# Patient Record
Sex: FEMALE | Race: WHITE | NOT HISPANIC OR LATINO | Employment: OTHER | URBAN - NONMETROPOLITAN AREA
[De-identification: names, ages, dates, MRNs, and addresses within clinical notes are randomized per-mention and may not be internally consistent; named-entity substitution may affect disease eponyms.]

---

## 2017-12-05 PROCEDURE — G0123 SCREEN CERV/VAG THIN LAYER: HCPCS | Performed by: OBSTETRICS & GYNECOLOGY

## 2017-12-06 ENCOUNTER — LAB REQUISITION (OUTPATIENT)
Dept: LAB | Facility: HOSPITAL | Age: 67
End: 2017-12-06

## 2017-12-06 DIAGNOSIS — Z12.72 ENCOUNTER FOR SCREENING FOR MALIGNANT NEOPLASM OF VAGINA: ICD-10-CM

## 2017-12-07 LAB
GEN CATEG CVX/VAG CYTO-IMP: NORMAL
LAB AP CASE REPORT: NORMAL
LAB AP GYN ADDITIONAL INFORMATION: NORMAL
LAB AP GYN OTHER FINDINGS: NORMAL
Lab: NORMAL
PATH INTERP SPEC-IMP: NORMAL
STAT OF ADQ CVX/VAG CYTO-IMP: NORMAL

## 2022-06-10 ENCOUNTER — TELEPHONE (OUTPATIENT)
Dept: GASTROENTEROLOGY | Facility: CLINIC | Age: 72
End: 2022-06-10

## 2022-06-10 NOTE — TELEPHONE ENCOUNTER
Pt called and said she does not want to be fast tracked. She wants to be seen in the office and is alright with being seen by Dr. Case on 6-16.

## 2022-06-12 PROBLEM — Z86.010 HISTORY OF ADENOMATOUS POLYP OF COLON: Status: ACTIVE | Noted: 2022-06-12

## 2022-06-12 PROBLEM — Z86.0101 HISTORY OF ADENOMATOUS POLYP OF COLON: Status: ACTIVE | Noted: 2022-06-12

## 2022-06-12 NOTE — PROGRESS NOTES
Primary Physician: Micheline Cervantes MD    Chief Complaint   Patient presents with   • Colon Cancer Screening     Pt presents today for evaluation for colonoscopy-pt's last colon was 7/24/2019 by Dr. Bianchi-personal history of colon polyps; Family history of colon polyps       Subjective     My Marcos is a 72 y.o. female.    HPI   History adenomatous colon polyps  Cologuard testing was positive in July 2019.  Colonoscopy was done at [facility] to assess.  A sessile, flat, villous polyp about 2.5 to 3 cm in diameter in the vicinity of the appendix in the base of the cecum was noted. SM injections of 1:10k epinephrine in NS were performed at the base and around the polyp to reduce risks of post polypectomy hemorrhage and perforation. The lesion was then resected in a piecemeal fashion with a hot snare.  Pathology showed tubulovillous adenoma without any high-grade dysplasia.  A rectal polyp was also ablated.  She was to have a repeat colonoscopy in 3 to 6 months as per standard of care.  She was not happy with her previous GI provider.    In the past she couldnot tolerate the GoLytely prep due to nausea.  Her last preparation was adequate using the MiraLAX prep.      Past Medical History:   Diagnosis Date   • Family history of colonic polyps    • Gout    • History of colon polyps    • Hyperlipidemia    • Hypertension    • Hypothyroidism    • Stage 3a chronic kidney disease (HCC)    • Vitamin D deficiency        Past Surgical History:   Procedure Laterality Date   • COLONOSCOPY  07/24/2019    Dr. Bianchi-Large tubulovillous adenoma in cecum removed in piecemeal fashion.  Rectal polyp ablated.   • HYSTERECTOMY     • OOPHORECTOMY     • ROTATOR CUFF REPAIR Left    • THYROIDECTOMY Bilateral 07/2014    Had one side removed 7/2014 and other side removed 12/2014        Current Outpatient Medications:   •  allopurinol (ZYLOPRIM) 100 MG tablet, Take 2 tablets by mouth Daily., Disp: , Rfl:   •  ascorbic acid  "(VITAMIN C) 500 MG tablet, Take 500 mg by mouth Daily., Disp: , Rfl:   •  aspirin (aspirin) 81 MG EC tablet, Take 81 mg by mouth Daily., Disp: , Rfl:   •  atenolol (TENORMIN) 25 MG tablet, Take 1 tablet by mouth Daily., Disp: , Rfl:   •  atorvastatin (LIPITOR) 20 MG tablet, Take 1 tablet by mouth Daily., Disp: , Rfl:   •  cholecalciferol (VITAMIN D3) 25 MCG (1000 UT) tablet, Take 1,000 Units by mouth Daily., Disp: , Rfl:   •  furosemide (LASIX) 20 MG tablet, Take 20 mg by mouth Daily., Disp: , Rfl:   •  levothyroxine (SYNTHROID, LEVOTHROID) 112 MCG tablet, Take 112 mcg by mouth Daily., Disp: , Rfl:   •  NIFEdipine XL (PROCARDIA XL) 30 MG 24 hr tablet, Take 30 mg by mouth Daily., Disp: , Rfl:     Allergies   Allergen Reactions   • Ace Inhibitors Hives   • Codeine Rash       Social History     Socioeconomic History   • Marital status:    Tobacco Use   • Smoking status: Never Smoker   • Smokeless tobacco: Never Used   Vaping Use   • Vaping Use: Never used   Substance and Sexual Activity   • Alcohol use: Not Currently   • Drug use: Defer   • Sexual activity: Defer       Family History   Problem Relation Age of Onset   • Colon polyps Sister 60   • Colon cancer Neg Hx    • Esophageal cancer Neg Hx    • Liver cancer Neg Hx    • Liver disease Neg Hx    • Rectal cancer Neg Hx    • Stomach cancer Neg Hx        Review of Systems   Respiratory: Negative for shortness of breath.    Cardiovascular: Negative for chest pain.       Objective     /72 (BP Location: Left arm, Patient Position: Sitting, Cuff Size: Adult)   Pulse 75   Temp 97.7 °F (36.5 °C) (Infrared)   Ht 160 cm (63\")   Wt 78.9 kg (174 lb)   SpO2 98%   Breastfeeding No   BMI 30.82 kg/m²     Physical Exam  Constitutional:       Appearance: She is well-developed.   Pulmonary:      Effort: Pulmonary effort is normal.   Musculoskeletal:         General: Normal range of motion.   Skin:     General: Skin is warm.   Neurological:      Mental Status: She is " alert and oriented to person, place, and time.   Psychiatric:         Behavior: Behavior normal.         Lab Results - Last 18 Months   Lab Units 04/18/22  0927 10/26/21  0916 05/13/21  0826 03/24/21  0923   GLUCOSE mg/dL 105 115* 113* 131*   BUN mg/dL 26* 26* 22 25*   CREATININE mg/dL 1.1* 1* 1* 1*   SODIUM mmol/L 146* 142 145 142   POTASSIUM mmol/L 5.3* 5.0 3.8 3.2*   CHLORIDE mmol/L 104 102 105 98   TOTAL CO2 mmol/L 24 27 25 30*   TOTAL PROTEIN g/dL 7.2 7.3 7.6  --    ALBUMIN g/dL 5 5.2 4.8  --    ALT (SGPT) U/L 22 20 19  --    AST (SGOT) U/L 20 20 19  --    ALK PHOS U/L 120* 133* 123*  --    BILIRUBIN mg/dL 0.6 0.6 0.6  --        Lab Results - Last 18 Months   Lab Units 04/18/22  0927 10/26/21  0916 05/13/21  0826   HEMOGLOBIN g/dL 15.1 14.6 14.4   HEMATOCRIT % 48.0* 45.6 43.9   MCV fL 96.6 94.6 92.6   WBC K/uL 11.3* 11.8* 12.8*   RDW % 12.5 12.5 12.5   MPV fL 11.3 11.0 11.5   PLATELETS K/uL 318 296 304       Lab Results - Last 18 Months   Lab Units 04/18/22  0927 10/26/21  0916 05/13/21  0826   TSH uIU/mL 0.638 0.970 3.620   VIT D 25 HYDROXY ng/mL 47.8 60.1 52.7        Path from colonoscopy 7/2019:  FINAL DIAGNOSIS:     Colon, cecal polyp, polypectomy:   Fragments of tubulovillous adenoma   Negative for high-grade dysplasia    CLR/CLR     IMPRESSION/PLAN:    Assessment & Plan      Problem List Items Addressed This Visit        Gastrointestinal Abdominal     History of adenomatous polyp of colon - Primary    Overview     Cologuard testing was positive in July 2019.  Colonoscopy was done at [facility] to assess.  A sessile, flat, villous polyp about 2.5 to 3 cm in diameter in the vicinity of the appendix in the base of the Cecum was noted. SM injections of 1:10k epinephrine in NS were performed at the base and around the polyp to reduce risks of post polypectomy hemorrhage and perforation. The lesion was then resected in a piecemeal fashion with a hot snare.  Pathology showed tubulovillous adenoma without any  high-grade dysplasia.  A rectal polyp was also ablated.  She was to have a repeat colonoscopy in 3 to 6 months as per standard of care.           Current Assessment & Plan     The risks, benefits, and alternatives of colonoscopy were reviewed with the patient today.  Risks including perforation of the colon possibly requiring surgery or colostomy.  Additional risks include risk of bleeding from biopsies or removal of colon tissue.  There is also the risk of a drug reaction or problems with anesthesia.  This will be discussed with the further by the anesthesia team on the day of the procedure.  Lastly there is a possibility of missing a colon polyp or cancer.  The benefits include the diagnosis and management of disease of the colon and rectum.  Alternatives to colonoscopy include barium enema, laboratory testing, radiographic evaluation, or no intervention.  The patient verbalizes understanding and agrees.    In accordance with requirements under the Affordable Care Act, [facility] has provided pricing for all hospital services and items on each of its websites. However, a patient's actual cost may differ based on the services the patient receives to meet individual healthcare needs and based on the benefits provided under the patient’s insurance coverage.             Relevant Orders    Case Request (Completed)                          Laly Case MD  06/16/22  13:13 CDT    Much of this encounter note is an electronic transcription/translation of spoken language to printed text. The electronic translation of spoken language may permit erroneous, or at times, nonsensical words or phrases to be inadvertently transcribed; although I have reviewed the note for such errors, some may still exist.

## 2022-06-12 NOTE — ASSESSMENT & PLAN NOTE
The risks, benefits, and alternatives of colonoscopy were reviewed with the patient today.  Risks including perforation of the colon possibly requiring surgery or colostomy.  Additional risks include risk of bleeding from biopsies or removal of colon tissue.  There is also the risk of a drug reaction or problems with anesthesia.  This will be discussed with the further by the anesthesia team on the day of the procedure.  Lastly there is a possibility of missing a colon polyp or cancer.  The benefits include the diagnosis and management of disease of the colon and rectum.  Alternatives to colonoscopy include barium enema, laboratory testing, radiographic evaluation, or no intervention.  The patient verbalizes understanding and agrees.    In accordance with requirements under the Affordable Care Act, [facility] has provided pricing for all hospital services and items on each of its websites. However, a patient's actual cost may differ based on the services the patient receives to meet individual healthcare needs and based on the benefits provided under the patient’s insurance coverage.

## 2022-06-12 NOTE — H&P (VIEW-ONLY)
Primary Physician: Micheline Cervantes MD    Chief Complaint   Patient presents with   • Colon Cancer Screening     Pt presents today for evaluation for colonoscopy-pt's last colon was 7/24/2019 by Dr. Bianchi-personal history of colon polyps; Family history of colon polyps       Subjective     My Marcos is a 72 y.o. female.    HPI   History adenomatous colon polyps  Cologuard testing was positive in July 2019.  Colonoscopy was done at [facility] to assess.  A sessile, flat, villous polyp about 2.5 to 3 cm in diameter in the vicinity of the appendix in the base of the cecum was noted. SM injections of 1:10k epinephrine in NS were performed at the base and around the polyp to reduce risks of post polypectomy hemorrhage and perforation. The lesion was then resected in a piecemeal fashion with a hot snare.  Pathology showed tubulovillous adenoma without any high-grade dysplasia.  A rectal polyp was also ablated.  She was to have a repeat colonoscopy in 3 to 6 months as per standard of care.  She was not happy with her previous GI provider.    In the past she couldnot tolerate the GoLytely prep due to nausea.  Her last preparation was adequate using the MiraLAX prep.      Past Medical History:   Diagnosis Date   • Family history of colonic polyps    • Gout    • History of colon polyps    • Hyperlipidemia    • Hypertension    • Hypothyroidism    • Stage 3a chronic kidney disease (HCC)    • Vitamin D deficiency        Past Surgical History:   Procedure Laterality Date   • COLONOSCOPY  07/24/2019    Dr. Bianchi-Large tubulovillous adenoma in cecum removed in piecemeal fashion.  Rectal polyp ablated.   • HYSTERECTOMY     • OOPHORECTOMY     • ROTATOR CUFF REPAIR Left    • THYROIDECTOMY Bilateral 07/2014    Had one side removed 7/2014 and other side removed 12/2014        Current Outpatient Medications:   •  allopurinol (ZYLOPRIM) 100 MG tablet, Take 2 tablets by mouth Daily., Disp: , Rfl:   •  ascorbic acid  "(VITAMIN C) 500 MG tablet, Take 500 mg by mouth Daily., Disp: , Rfl:   •  aspirin (aspirin) 81 MG EC tablet, Take 81 mg by mouth Daily., Disp: , Rfl:   •  atenolol (TENORMIN) 25 MG tablet, Take 1 tablet by mouth Daily., Disp: , Rfl:   •  atorvastatin (LIPITOR) 20 MG tablet, Take 1 tablet by mouth Daily., Disp: , Rfl:   •  cholecalciferol (VITAMIN D3) 25 MCG (1000 UT) tablet, Take 1,000 Units by mouth Daily., Disp: , Rfl:   •  furosemide (LASIX) 20 MG tablet, Take 20 mg by mouth Daily., Disp: , Rfl:   •  levothyroxine (SYNTHROID, LEVOTHROID) 112 MCG tablet, Take 112 mcg by mouth Daily., Disp: , Rfl:   •  NIFEdipine XL (PROCARDIA XL) 30 MG 24 hr tablet, Take 30 mg by mouth Daily., Disp: , Rfl:     Allergies   Allergen Reactions   • Ace Inhibitors Hives   • Codeine Rash       Social History     Socioeconomic History   • Marital status:    Tobacco Use   • Smoking status: Never Smoker   • Smokeless tobacco: Never Used   Vaping Use   • Vaping Use: Never used   Substance and Sexual Activity   • Alcohol use: Not Currently   • Drug use: Defer   • Sexual activity: Defer       Family History   Problem Relation Age of Onset   • Colon polyps Sister 60   • Colon cancer Neg Hx    • Esophageal cancer Neg Hx    • Liver cancer Neg Hx    • Liver disease Neg Hx    • Rectal cancer Neg Hx    • Stomach cancer Neg Hx        Review of Systems   Respiratory: Negative for shortness of breath.    Cardiovascular: Negative for chest pain.       Objective     /72 (BP Location: Left arm, Patient Position: Sitting, Cuff Size: Adult)   Pulse 75   Temp 97.7 °F (36.5 °C) (Infrared)   Ht 160 cm (63\")   Wt 78.9 kg (174 lb)   SpO2 98%   Breastfeeding No   BMI 30.82 kg/m²     Physical Exam  Constitutional:       Appearance: She is well-developed.   Pulmonary:      Effort: Pulmonary effort is normal.   Musculoskeletal:         General: Normal range of motion.   Skin:     General: Skin is warm.   Neurological:      Mental Status: She is " alert and oriented to person, place, and time.   Psychiatric:         Behavior: Behavior normal.         Lab Results - Last 18 Months   Lab Units 04/18/22  0927 10/26/21  0916 05/13/21  0826 03/24/21  0923   GLUCOSE mg/dL 105 115* 113* 131*   BUN mg/dL 26* 26* 22 25*   CREATININE mg/dL 1.1* 1* 1* 1*   SODIUM mmol/L 146* 142 145 142   POTASSIUM mmol/L 5.3* 5.0 3.8 3.2*   CHLORIDE mmol/L 104 102 105 98   TOTAL CO2 mmol/L 24 27 25 30*   TOTAL PROTEIN g/dL 7.2 7.3 7.6  --    ALBUMIN g/dL 5 5.2 4.8  --    ALT (SGPT) U/L 22 20 19  --    AST (SGOT) U/L 20 20 19  --    ALK PHOS U/L 120* 133* 123*  --    BILIRUBIN mg/dL 0.6 0.6 0.6  --        Lab Results - Last 18 Months   Lab Units 04/18/22  0927 10/26/21  0916 05/13/21  0826   HEMOGLOBIN g/dL 15.1 14.6 14.4   HEMATOCRIT % 48.0* 45.6 43.9   MCV fL 96.6 94.6 92.6   WBC K/uL 11.3* 11.8* 12.8*   RDW % 12.5 12.5 12.5   MPV fL 11.3 11.0 11.5   PLATELETS K/uL 318 296 304       Lab Results - Last 18 Months   Lab Units 04/18/22  0927 10/26/21  0916 05/13/21  0826   TSH uIU/mL 0.638 0.970 3.620   VIT D 25 HYDROXY ng/mL 47.8 60.1 52.7        Path from colonoscopy 7/2019:  FINAL DIAGNOSIS:     Colon, cecal polyp, polypectomy:   Fragments of tubulovillous adenoma   Negative for high-grade dysplasia    CLR/CLR     IMPRESSION/PLAN:    Assessment & Plan      Problem List Items Addressed This Visit        Gastrointestinal Abdominal     History of adenomatous polyp of colon - Primary    Overview     Cologuard testing was positive in July 2019.  Colonoscopy was done at [facility] to assess.  A sessile, flat, villous polyp about 2.5 to 3 cm in diameter in the vicinity of the appendix in the base of the Cecum was noted. SM injections of 1:10k epinephrine in NS were performed at the base and around the polyp to reduce risks of post polypectomy hemorrhage and perforation. The lesion was then resected in a piecemeal fashion with a hot snare.  Pathology showed tubulovillous adenoma without any  high-grade dysplasia.  A rectal polyp was also ablated.  She was to have a repeat colonoscopy in 3 to 6 months as per standard of care.           Current Assessment & Plan     The risks, benefits, and alternatives of colonoscopy were reviewed with the patient today.  Risks including perforation of the colon possibly requiring surgery or colostomy.  Additional risks include risk of bleeding from biopsies or removal of colon tissue.  There is also the risk of a drug reaction or problems with anesthesia.  This will be discussed with the further by the anesthesia team on the day of the procedure.  Lastly there is a possibility of missing a colon polyp or cancer.  The benefits include the diagnosis and management of disease of the colon and rectum.  Alternatives to colonoscopy include barium enema, laboratory testing, radiographic evaluation, or no intervention.  The patient verbalizes understanding and agrees.    In accordance with requirements under the Affordable Care Act, [facility] has provided pricing for all hospital services and items on each of its websites. However, a patient's actual cost may differ based on the services the patient receives to meet individual healthcare needs and based on the benefits provided under the patient’s insurance coverage.             Relevant Orders    Case Request (Completed)                          Laly Case MD  06/16/22  13:13 CDT    Much of this encounter note is an electronic transcription/translation of spoken language to printed text. The electronic translation of spoken language may permit erroneous, or at times, nonsensical words or phrases to be inadvertently transcribed; although I have reviewed the note for such errors, some may still exist.

## 2022-06-16 ENCOUNTER — OFFICE VISIT (OUTPATIENT)
Dept: GASTROENTEROLOGY | Facility: CLINIC | Age: 72
End: 2022-06-16

## 2022-06-16 VITALS
HEART RATE: 75 BPM | WEIGHT: 174 LBS | HEIGHT: 63 IN | TEMPERATURE: 97.7 F | BODY MASS INDEX: 30.83 KG/M2 | SYSTOLIC BLOOD PRESSURE: 126 MMHG | DIASTOLIC BLOOD PRESSURE: 72 MMHG | OXYGEN SATURATION: 98 %

## 2022-06-16 DIAGNOSIS — Z86.010 HISTORY OF ADENOMATOUS POLYP OF COLON: Primary | ICD-10-CM

## 2022-06-16 PROCEDURE — S0260 H&P FOR SURGERY: HCPCS | Performed by: INTERNAL MEDICINE

## 2022-06-16 RX ORDER — ALLOPURINOL 100 MG/1
2 TABLET ORAL DAILY
COMMUNITY
Start: 2022-04-05

## 2022-06-16 RX ORDER — ATENOLOL 25 MG/1
1 TABLET ORAL DAILY
COMMUNITY
Start: 2022-04-05

## 2022-06-16 RX ORDER — ATORVASTATIN CALCIUM 20 MG/1
1 TABLET, FILM COATED ORAL DAILY
COMMUNITY
Start: 2022-04-05

## 2022-06-16 RX ORDER — ASPIRIN 81 MG/1
81 TABLET ORAL DAILY
COMMUNITY

## 2022-06-16 RX ORDER — MELATONIN
1000 DAILY
COMMUNITY

## 2022-06-16 RX ORDER — ASCORBIC ACID 500 MG
500 TABLET ORAL DAILY
COMMUNITY

## 2022-06-16 RX ORDER — FUROSEMIDE 20 MG/1
20 TABLET ORAL DAILY
COMMUNITY
Start: 2022-04-05

## 2022-06-16 RX ORDER — NIFEDIPINE 30 MG/1
30 TABLET, EXTENDED RELEASE ORAL DAILY
COMMUNITY

## 2022-06-16 RX ORDER — LEVOTHYROXINE SODIUM 112 UG/1
112 TABLET ORAL DAILY
COMMUNITY

## 2022-06-17 ENCOUNTER — PATIENT ROUNDING (BHMG ONLY) (OUTPATIENT)
Dept: GASTROENTEROLOGY | Facility: CLINIC | Age: 72
End: 2022-06-17

## 2022-06-17 NOTE — PROGRESS NOTES
June 17, 2022    Hello, may I speak with My ZAMBRANO?    My name is Micheline Angelo      I am  with [facility]  [address], [address]  [place] [address] [phone].    Left message

## 2022-06-22 ENCOUNTER — ANESTHESIA (OUTPATIENT)
Dept: GASTROENTEROLOGY | Facility: HOSPITAL | Age: 72
End: 2022-06-22

## 2022-06-22 ENCOUNTER — HOSPITAL ENCOUNTER (OUTPATIENT)
Facility: HOSPITAL | Age: 72
Setting detail: HOSPITAL OUTPATIENT SURGERY
Discharge: HOME | End: 2022-06-22
Attending: INTERNAL MEDICINE | Admitting: INTERNAL MEDICINE

## 2022-06-22 ENCOUNTER — ANESTHESIA EVENT (OUTPATIENT)
Dept: GASTROENTEROLOGY | Facility: HOSPITAL | Age: 72
End: 2022-06-22

## 2022-06-22 VITALS
TEMPERATURE: 98 F | HEART RATE: 44 BPM | RESPIRATION RATE: 18 BRPM | DIASTOLIC BLOOD PRESSURE: 64 MMHG | OXYGEN SATURATION: 97 % | HEIGHT: 63 IN | SYSTOLIC BLOOD PRESSURE: 153 MMHG | WEIGHT: 169 LBS | BODY MASS INDEX: 29.95 KG/M2

## 2022-06-22 DIAGNOSIS — Z86.010 HISTORY OF ADENOMATOUS POLYP OF COLON: ICD-10-CM

## 2022-06-22 PROCEDURE — 25010000002 PROPOFOL 10 MG/ML EMULSION: Performed by: NURSE ANESTHETIST, CERTIFIED REGISTERED

## 2022-06-22 PROCEDURE — 88305 TISSUE EXAM BY PATHOLOGIST: CPT | Performed by: INTERNAL MEDICINE

## 2022-06-22 PROCEDURE — 45385 COLONOSCOPY W/LESION REMOVAL: CPT | Performed by: INTERNAL MEDICINE

## 2022-06-22 RX ORDER — SODIUM CHLORIDE 0.9 % (FLUSH) 0.9 %
10 SYRINGE (ML) INJECTION AS NEEDED
Status: DISCONTINUED | OUTPATIENT
Start: 2022-06-22 | End: 2022-06-22 | Stop reason: HOSPADM

## 2022-06-22 RX ORDER — SODIUM CHLORIDE 9 MG/ML
500 INJECTION, SOLUTION INTRAVENOUS CONTINUOUS PRN
Status: DISCONTINUED | OUTPATIENT
Start: 2022-06-22 | End: 2022-06-22 | Stop reason: HOSPADM

## 2022-06-22 RX ORDER — LIDOCAINE HYDROCHLORIDE 10 MG/ML
0.5 INJECTION, SOLUTION EPIDURAL; INFILTRATION; INTRACAUDAL; PERINEURAL ONCE AS NEEDED
Status: DISCONTINUED | OUTPATIENT
Start: 2022-06-22 | End: 2022-06-22 | Stop reason: HOSPADM

## 2022-06-22 RX ORDER — PROPOFOL 10 MG/ML
VIAL (ML) INTRAVENOUS AS NEEDED
Status: DISCONTINUED | OUTPATIENT
Start: 2022-06-22 | End: 2022-06-22 | Stop reason: SURG

## 2022-06-22 RX ADMIN — PROPOFOL 50 MG: 10 INJECTION, EMULSION INTRAVENOUS at 12:34

## 2022-06-22 RX ADMIN — PROPOFOL 50 MG: 10 INJECTION, EMULSION INTRAVENOUS at 12:38

## 2022-06-22 RX ADMIN — SODIUM CHLORIDE 500 ML: 9 INJECTION, SOLUTION INTRAVENOUS at 11:22

## 2022-06-22 RX ADMIN — PROPOFOL 100 MG: 10 INJECTION, EMULSION INTRAVENOUS at 12:31

## 2022-06-22 RX ADMIN — PROPOFOL 50 MG: 10 INJECTION, EMULSION INTRAVENOUS at 12:46

## 2022-06-22 NOTE — ANESTHESIA PREPROCEDURE EVALUATION
Anesthesia Evaluation     Patient summary reviewed and Nursing notes reviewed   history of anesthetic complications: PONV  NPO Solid Status: > 8 hours  NPO Liquid Status: > 2 hours           Airway   Mallampati: I  TM distance: >3 FB  Neck ROM: full  No difficulty expected  Dental      Pulmonary    (-) sleep apnea, not a smoker  Cardiovascular   Exercise tolerance: good (4-7 METS)    (+) hypertension, hyperlipidemia,   (-) CAD      Neuro/Psych  (-) seizures, TIA, CVA  GI/Hepatic/Renal/Endo    (+)   renal disease CRI, thyroid problem hypothyroidism  (-) liver disease    Musculoskeletal     Abdominal    Substance History      OB/GYN          Other                        Anesthesia Plan    ASA 2     MAC     intravenous induction     Anesthetic plan, risks, benefits, and alternatives have been provided, discussed and informed consent has been obtained with: patient.        CODE STATUS:

## 2022-06-23 LAB
CYTO UR: NORMAL
LAB AP CASE REPORT: NORMAL
Lab: NORMAL
PATH REPORT.FINAL DX SPEC: NORMAL
PATH REPORT.GROSS SPEC: NORMAL

## 2022-06-24 NOTE — PROGRESS NOTES
I am writing to inform you that the polyps removed from the colon did not have any cancer. They no longer pose a threat to you since they were completely removed.  Because you had that larger polyp removed initially, however, I would prefer to repeat your colonoscopy in 3 years rather than 5 years I originally stated.    If you have any questions, please call our office.    Sincerely,        Laly Case MD

## 2024-04-19 NOTE — ANESTHESIA POSTPROCEDURE EVALUATION
Patient: Kush ZAMBRANO    Procedure Summary     Date: 06/22/22 Room / Location: [facility] ENDOSCOPY 4 /  [facility]    Anesthesia Start: 1228 Anesthesia Stop: 1250    Procedure: COLONOSCOPY WITH ANESTHESIA (N/A ) Diagnosis:       History of adenomatous polyp of colon      (History of adenomatous polyp of colon [Z86.010])    Surgeons: Laly Case MD Provider: Yaz Blackman CRNA    Anesthesia Type: MAC ASA Status: 2          Anesthesia Type: MAC    Vitals  Vitals Value Taken Time   /64 06/22/22 1250   Temp 98 °F (36.7 °C) 06/22/22 1250   Pulse 63 06/22/22 1250   Resp 13 06/22/22 1250   SpO2 97 % 06/22/22 1250           Post Anesthesia Care and Evaluation    Patient location during evaluation: PHASE II  Patient participation: complete - patient participated  Pain score: 0  Pain management: adequate    Airway patency: patent  Anesthetic complications: No anesthetic complications  PONV Status: none  Cardiovascular status: acceptable and stable  Respiratory status: acceptable and nasal cannula  Hydration status: acceptable  No anesthesia care post op     No

## 2025-07-23 ENCOUNTER — OFFICE VISIT (OUTPATIENT)
Dept: GASTROENTEROLOGY | Facility: CLINIC | Age: 75
End: 2025-07-23
Payer: MEDICARE

## 2025-07-23 VITALS
TEMPERATURE: 97.5 F | SYSTOLIC BLOOD PRESSURE: 124 MMHG | HEIGHT: 63 IN | BODY MASS INDEX: 30.3 KG/M2 | OXYGEN SATURATION: 97 % | DIASTOLIC BLOOD PRESSURE: 78 MMHG | HEART RATE: 64 BPM | WEIGHT: 171 LBS

## 2025-07-23 DIAGNOSIS — Z83.719 FH: COLON POLYPS: ICD-10-CM

## 2025-07-23 DIAGNOSIS — Z86.0101 HISTORY OF ADENOMATOUS POLYP OF COLON: Primary | ICD-10-CM

## 2025-07-23 NOTE — PROGRESS NOTES
Primary Physician: Micheline Cervantes MD    Chief Complaint   Patient presents with    Colon Cancer Screening     Pt presents today for colon recall-last colon was 6/22/2022; Personal history of adenomatous and hyperplastic polyps; Family history of colon polyps       Subjective     Kush ZAMBRANO is a 75 y.o. female.    HPI  Hx Adenomatous Colon Polyps  6/22/2022 Colonoscopy: 5mm polyp removed from the cecum and 4mm hyperplastic polyp removed from sigmoid colon.  7/2019 Adenomatous polyp from cecum removed.    Pt denies any change in bowel habits. No diarrhea, constipation, abdominal pain or rectal bleeding.    No family hx colon cancer.  Sister had colon polyps < 60.    4/23/2025 Creatinine 1.0/GFR 59    Past Medical History:   Diagnosis Date    Family history of colonic polyps     Gout     History of adenomatous polyp of colon     History of colon polyps     History of thyroid cancer     Hyperlipidemia     Hypertension     Hypothyroidism     Stage 3a chronic kidney disease     Vitamin D deficiency        Past Surgical History:   Procedure Laterality Date    COLONOSCOPY  07/24/2019    Dr. Bianchi-Large tubulovillous adenoma in cecum removed in piecemeal fashion-Injected;  One 3mm polyp in the rectum-ablated; Otherwise, the mucosa appeared normal throughout the entire examined colon; Small hemorrhoids; Repeat 3-6 months depending on biopsy results    COLONOSCOPY N/A 06/22/2022    One 5mm polyp in the cecum-Complete resection-Polyp tissue not retrieved; One 4mm hyperplastic polyp in the sigmoid colon; Repeat 3 years    HYSTERECTOMY      LIPOMA EXCISION  03/2025    From back of neck    OOPHORECTOMY      ROTATOR CUFF REPAIR Left     THYROIDECTOMY Bilateral 07/2014    Had one side removed 7/2014 and other side removed 12/2014        Current Outpatient Medications:     allopurinol (ZYLOPRIM) 100 MG tablet, Take 2 tablets by mouth Daily., Disp: , Rfl:     ascorbic acid (VITAMIN C) 500 MG tablet, Take 1  "tablet by mouth Daily., Disp: , Rfl:     aspirin (aspirin) 81 MG EC tablet, Take 1 tablet by mouth Daily., Disp: , Rfl:     atenolol (TENORMIN) 25 MG tablet, Take 1 tablet by mouth Daily., Disp: , Rfl:     atorvastatin (LIPITOR) 20 MG tablet, Take 1 tablet by mouth Daily., Disp: , Rfl:     cholecalciferol (VITAMIN D3) 25 MCG (1000 UT) tablet, Take 1 tablet by mouth Daily., Disp: , Rfl:     furosemide (LASIX) 20 MG tablet, Take 1 tablet by mouth Daily., Disp: , Rfl:     levothyroxine (SYNTHROID, LEVOTHROID) 100 MCG tablet, Take 1 tablet by mouth Daily., Disp: , Rfl:     NIFEdipine XL (PROCARDIA XL) 30 MG 24 hr tablet, Take 1 tablet by mouth Daily., Disp: , Rfl:     Allergies   Allergen Reactions    Ace Inhibitors Hives    Codeine Nausea And Vomiting and Rash       Social History     Socioeconomic History    Marital status:    Tobacco Use    Smoking status: Never    Smokeless tobacco: Never   Vaping Use    Vaping status: Never Used   Substance and Sexual Activity    Alcohol use: Not Currently    Drug use: Defer    Sexual activity: Never       Family History   Problem Relation Age of Onset    Colon polyps Sister 60        Had approx 10\" of colon removed due to polyps    Colon cancer Neg Hx     Esophageal cancer Neg Hx     Liver cancer Neg Hx     Liver disease Neg Hx     Rectal cancer Neg Hx     Stomach cancer Neg Hx        Review of Systems   Constitutional:  Negative for unexpected weight change.   Respiratory:  Negative for shortness of breath.    Cardiovascular:  Negative for chest pain.       Objective     /78 (BP Location: Left arm, Patient Position: Sitting, Cuff Size: Adult)   Pulse 64   Temp 97.5 °F (36.4 °C) (Infrared)   Ht 160 cm (63\")   Wt 77.6 kg (171 lb)   SpO2 97%   Breastfeeding No   BMI 30.29 kg/m²     Physical Exam  Vitals reviewed.   Constitutional:       Appearance: Normal appearance.   Cardiovascular:      Rate and Rhythm: Normal rate and regular rhythm.      Heart sounds: Normal " heart sounds.   Pulmonary:      Effort: Pulmonary effort is normal.      Breath sounds: Normal breath sounds.   Neurological:      Mental Status: She is alert.         Lab Results - Last 18 Months   Lab Units 04/23/25  0920 02/25/25  1559 10/29/24  0908   GLUCOSE mg/dL 106* 106* 104*   BUN mg/dL 24* 36* 22   CREATININE mg/dL 1.0* 1.1* 0.9   SODIUM mmol/L 141 143 144   POTASSIUM mmol/L 4.3 4 4.4   CHLORIDE mmol/L 104 106 104   TOTAL CO2 mmol/L 26 24 25   TOTAL PROTEIN g/dL 7.1  --  7.3   ALBUMIN g/dL 4.6  --  4.6   ALT (SGPT) U/L 26  --  20   AST (SGOT) U/L 24  --  24   ALK PHOS U/L 108*  --  116*   BILIRUBIN mg/dL 0.5  --  0.6       Lab Results - Last 18 Months   Lab Units 04/23/25  0920 02/25/25  1559 10/29/24  0908 04/24/24  0920   HEMOGLOBIN g/dL 14.6 15 14.5 14.4   HEMATOCRIT % 44.5 46.1 45.4 43.1   MCV fL 94.3 93.9 97 93.7   WBC K/uL 12.6* 16.7* 12.8* 11.6*   RDW % 12.5 12.8 12.6 12.7   MPV fL 11.1 10.8 11.7 11.5   PLATELETS K/uL 294 293 289 295       Lab Results - Last 18 Months   Lab Units 04/23/25  0920 10/29/24  0908 04/24/24  0920   TSH uIU/mL 1.52 1.33 1.61   VIT D 25 HYDROXY ng/mL 58.7 74.3 56.0          IMPRESSION/PLAN:    Assessment & Plan      Problem List Items Addressed This Visit       History of adenomatous polyp of colon - Primary    Overview   Cologuard testing was positive in July 2019.  Colonoscopy showed a sessile, flat, villous polyp about 2.5 to 3 cm in diameter in the vicinity of the appendix resected in a piecemeal fashion with a hot snare.  Pathology showed tubulovillous adenoma without any high-grade dysplasia.  Colonoscopy 6/2022 two polyps resected.         Current Assessment & Plan   Plan Colonoscopy per Dr Case         Relevant Orders    Case Request (Completed)    Follow Anesthesia Guidelines / Protocol    FH: colon polyps    Overview   Sister had colon polyps < 60          Colonoscopy per Dr Manpreet Leos Prep          ..The risks, benefits, and alternatives of colonoscopy were  reviewed with the patient today.  Risks including perforation of the colon possibly requiring surgery or colostomy.  Additional risks include risk of bleeding from biopsies or removal of colon tissue.  There is also the risk of a drug reaction or problems with anesthesia.  This will be discussed with the further by the anesthesia team on the day of the procedure.  Lastly there is a possibility of missing a colon polyp or cancer.  The benefits include the diagnosis and management of disease of the colon and rectum.  Alternatives to colonoscopy include barium enema, laboratory testing, radiographic evaluation, or no intervention.  The patient verbalizes understanding and agrees.    In accordance with requirements under the Affordable Care Act, [facility] has provided pricing for all hospital services and items on each of its websites. However, a patient's actual cost may differ based on the services the patient receives to meet individual healthcare needs and based on the benefits provided under the patient’s insurance coverage.        Ade Abbott, APRN  07/23/25  13:14 CDT    Part of this note may be an electronic transcription/translation of spoken language to printed text.

## 2025-07-28 ENCOUNTER — TELEPHONE (OUTPATIENT)
Dept: GASTROENTEROLOGY | Facility: CLINIC | Age: 75
End: 2025-07-28
Payer: MEDICARE

## 2025-07-28 NOTE — TELEPHONE ENCOUNTER
Had pt on wait list. Called to see if pt could come on 7/30. She tells me she can't. Offered 8/1 and she isn't able to that day.

## (undated) DEVICE — Device: Brand: DEFENDO AIR/WATER/SUCTION AND BIOPSY VALVE

## (undated) DEVICE — TBG SMPL FLTR LINE NASL 02/C02 A/ BX/100

## (undated) DEVICE — SNAR POLYP SENSATION MICRO OVL 13 240X40

## (undated) DEVICE — FRCP BIOP ENDO CAPTURAPRO SPK SERR 2.8MM 230CM

## (undated) DEVICE — YANKAUER,BULB TIP WITH VENT: Brand: ARGYLE

## (undated) DEVICE — MASK,OXYGEN,MED CONC,ADLT,7' TUB, UC: Brand: PENDING

## (undated) DEVICE — THE SINGLE USE ETRAP – POLYP TRAP IS USED FOR SUCTION RETRIEVAL OF ENDOSCOPICALLY REMOVED POLYPS.: Brand: ETRAP

## (undated) DEVICE — CUFF,BP,DISP,1 TUBE,ADULT,HP: Brand: MEDLINE

## (undated) DEVICE — THE CHANNEL CLEANING BRUSH IS A NYLON FLEXI BRUSH ATTACHED TO A FLEXIBLE PLASTIC SHEATH DESIGNED TO SAFELY REMOVE DEBRIS FROM FLEXIBLE ENDOSCOPES.

## (undated) DEVICE — SENSR O2 OXIMAX FNGR A/ 18IN NONSTR